# Patient Record
Sex: MALE | Race: WHITE | Employment: OTHER | URBAN - METROPOLITAN AREA
[De-identification: names, ages, dates, MRNs, and addresses within clinical notes are randomized per-mention and may not be internally consistent; named-entity substitution may affect disease eponyms.]

---

## 2018-07-20 ENCOUNTER — TELEPHONE (OUTPATIENT)
Dept: BARIATRICS/WEIGHT MGMT | Age: 53
End: 2018-07-20

## 2018-08-03 ENCOUNTER — TELEPHONE (OUTPATIENT)
Dept: BARIATRICS/WEIGHT MGMT | Age: 53
End: 2018-08-03

## 2018-08-16 ENCOUNTER — TELEPHONE (OUTPATIENT)
Dept: BARIATRICS/WEIGHT MGMT | Age: 53
End: 2018-08-16

## 2018-08-16 ENCOUNTER — OFFICE VISIT (OUTPATIENT)
Dept: BARIATRICS/WEIGHT MGMT | Age: 53
End: 2018-08-16

## 2018-08-16 VITALS
HEART RATE: 80 BPM | BODY MASS INDEX: 44.1 KG/M2 | WEIGHT: 315 LBS | DIASTOLIC BLOOD PRESSURE: 82 MMHG | HEIGHT: 71 IN | RESPIRATION RATE: 18 BRPM | SYSTOLIC BLOOD PRESSURE: 144 MMHG

## 2018-08-16 DIAGNOSIS — E11.69 DIABETES MELLITUS TYPE 2 IN OBESE (HCC): ICD-10-CM

## 2018-08-16 DIAGNOSIS — E66.9 DIABETES MELLITUS TYPE 2 IN OBESE (HCC): ICD-10-CM

## 2018-08-16 DIAGNOSIS — E66.01 MORBID OBESITY WITH BMI OF 70 AND OVER, ADULT (HCC): Primary | ICD-10-CM

## 2018-08-16 DIAGNOSIS — Z01.818 PRE-OPERATIVE CLEARANCE: ICD-10-CM

## 2018-08-16 DIAGNOSIS — G47.33 OBSTRUCTIVE SLEEP APNEA: ICD-10-CM

## 2018-08-16 DIAGNOSIS — K21.9 CHRONIC GERD: Primary | ICD-10-CM

## 2018-08-16 DIAGNOSIS — E55.9 VITAMIN D DEFICIENCY: ICD-10-CM

## 2018-08-16 DIAGNOSIS — I10 ESSENTIAL HYPERTENSION: ICD-10-CM

## 2018-08-16 DIAGNOSIS — K21.9 CHRONIC GERD: ICD-10-CM

## 2018-08-16 PROCEDURE — 1036F TOBACCO NON-USER: CPT | Performed by: SURGERY

## 2018-08-16 PROCEDURE — 2022F DILAT RTA XM EVC RTNOPTHY: CPT | Performed by: SURGERY

## 2018-08-16 PROCEDURE — G8427 DOCREV CUR MEDS BY ELIG CLIN: HCPCS | Performed by: SURGERY

## 2018-08-16 PROCEDURE — 99205 OFFICE O/P NEW HI 60 MIN: CPT | Performed by: SURGERY

## 2018-08-16 PROCEDURE — 3017F COLORECTAL CA SCREEN DOC REV: CPT | Performed by: SURGERY

## 2018-08-16 PROCEDURE — 3046F HEMOGLOBIN A1C LEVEL >9.0%: CPT | Performed by: SURGERY

## 2018-08-16 PROCEDURE — G8417 CALC BMI ABV UP PARAM F/U: HCPCS | Performed by: SURGERY

## 2018-08-16 RX ORDER — AMLODIPINE BESYLATE 10 MG/1
10 TABLET ORAL DAILY
COMMUNITY

## 2018-08-16 RX ORDER — SOTALOL HYDROCHLORIDE 80 MG/1
80 TABLET ORAL 2 TIMES DAILY
COMMUNITY

## 2018-08-16 RX ORDER — FUROSEMIDE 40 MG/1
40 TABLET ORAL 2 TIMES DAILY
COMMUNITY

## 2018-08-16 NOTE — LETTER
[address] [facility] [employer]  [address] [address]  Phone: [phone]  Fax: [phone]    Tricia Saunders MD        August 16, 2018     Pierce Ro  [address] [facility] [id number]  [facility] [address]    Patient: Ruiz Hinds  MR Number: [id number]  YOB: 1965  Date of Visit: 8/16/2018    Dear Dr. Pierce Ro:    Thank you for the request for consultation for Flora Finnegan to me for the evaluation of obesity. Below are the relevant portions of my assessment and plan of care. Ruiz Hinds is a very pleasant 46 y.o. male with Obesity,  Body mass index is 70.57 kg/m². and multiple obesity related co-morbidities. Ruiz Hinds is very motivated to lose weight and being more healthy. We discussed how his weight affects his overall health including:  Patient Active Problem List   Diagnosis    Dysphagia    HTN (hypertension)    Abdominal pain    Diabetes mellitus ([address]    Morbid obesity with BMI of 70 and over, adult ([address]    Diabetes mellitus type 2 in obese ([address]    Pre-operative clearance    Obstructive sleep apnea    Chronic GERD    Essential hypertension      The patient underwent 30 minutes of dietary counseling. I have reviewed, discussed and agree with the dietary plan. Medical weight loss and different surgical options were discussed in details with patient. Kusum Sewell is interested in surgical weight loss. Patient is interested in Laparoscopic Sleeve Gastrectomy, which I believe is an excellent option. We will proceed with pre-operative work up labs and studies. Will also petition patient's  insurance for approval for this procedure. I advised the patient that we can't guarantee final insurance approval.    Patient received dietary handouts and education. Patient advised that its their responsibility to follow up for studies, referrals and/or labs ordered today. Also discussed in details the importance of follow up, as well following the recommendations and completing the whole program to improve outcomes when it comes to healthier lifestyle as well weight loss. Patient also advised about risks and benefits being on a strict dietary regimen as well using supplements. Patient agrees and wants to proceed with weight loss planning   Recommend the patient meets with our bariatrician Dr. Dav Murphy for medical weight loss to reach the pre-surgical weight goal (BMI<65) . Patient understands that if not able to lose weight, we won't be able to move forward with surgery. Patient will resume with me once weight goal reached. Patient Instructions   Patient received dietary handouts and education. Labs ordered. Cardiac Clearance. Psych Evaluation. CPAP Compliance Report if applicable . UltraSound Gallbladder. EGD (Upper Endoscopy) pt will schedule with his colonoscopy   Support Group. PCP Letter. Quit Smoking,  Alcohol and Carbonated Drinks  Weight History 2 yrs. F/U in 4 weeks. F/U with Behaviorist.        NEED TO GET BMI <65          Patient advised that its their responsibility to follow up for studies, referrals and/or labs ordered today. If you have questions, please do not hesitate to call me. I look forward to following [employer] along with you.     Sincerely,        Mónica Mcbride MD

## 2018-08-16 NOTE — PROGRESS NOTES
patient eats until he is full. Patient describes level of activity as very sedentary - pt is in motorized scooter. Surgery  Patient's greatest concern about having surgery is: dying. Patient describes the motivation for weight loss surgery to be: wants to be more active, diabetes and takes insulin, HTN, afib, previous MI, stomach ulcer, sleep apnea - does not use a cpap, gout, liver disease, hiatal hernia, GERD, kidney disease? ?.    Patient does feel confident in his ability to make these changes. The patient's expectations of post-surgical eating habits are realistic. Patient states he does understand the consequences of not complying with post-op food guidelines. Patient states he understands the long term changes in food intake that will be necessary for all occasions after surgery for the rest of her life. he does understand the long term food changes. Patient is deemed nutritionally appropriate to proceed ONLY with major changes to diet, lifestyle. Pt should see behaviorist prior to surgery    Goals  Weight: doesn't really matter - 250  Health Improvement: improve diabetes, sleep apnea, liver disease, hiatal hernia, GERD, gout, improve mobility and energy level    Assessment  Nutritional Needs: RMR=(9.99 x 229.5) + (6.25 x 180) - (4.92 x 52 y.o.) +5  = 3167 kcal x 1.3 (very sedentary activity factor)= 4117 kcal - 1000 (for 2 lb weight loss/week)= 3117 kcal.    Plan  Surgical procedure desired: not sure    Plan/Recommendations: Surgical Procedure: not sure  General weight loss/lifestyle modification strategies discussed (elicit support from others; identify saboteurs; non-food rewards, etc).     Goals:   -Eat 4-5 times daily  -Avoid high fat and high sugar foods  -Include protein with all meals and snacks  -Avoid carbonation and caffeine  -Avoid calorie containing beverages  -Increase physical activity as tolerated    PES Statement:  Overweight/Obesity related to lack of exercise, sedentary

## 2018-08-16 NOTE — PATIENT INSTRUCTIONS
Patient received dietary handouts and education. Labs ordered. Cardiac Clearance. Psych Evaluation. CPAP Compliance Report if applicable . UltraSound Gallbladder. EGD (Upper Endoscopy) pt will schedule with his colonoscopy   Support Group. PCP Letter. Quit Smoking,  Alcohol and Carbonated Drinks  Weight History 2 yrs. F/U in 4 weeks. F/U with Behaviorist.        NEED TO GET BMI <65          Patient advised that its their responsibility to follow up for studies, referrals and/or labs ordered today.

## 2018-08-16 NOTE — PROGRESS NOTES
[facility]) Physicians   General & Laparoscopic Surgery  [employer]    Chief Complaint   Patient presents with    Bariatric, Initial Visit     NP S Medicare           HPI:    Khushboo Hernandez is a very pleasant 46 y.o. obese male ,   Body mass index is 70.57 kg/m². And multiple medical problems who is presenting for weight loss surgery evaluation and consultation by Dr. Janet Colon. Patient has been struggling for several years now with obesity. Patient feels the weight is an obstacle to achieve and perform things in daily living as well risk on health. Tries to diet, and exercise but can't keep the weight off. Patient tried Atkins Diet, Weight Watcher Anonymous, calorie restriction, low carb, and physician supervised. Patient has not participated in meal replacement/liquid diets and other regimens, but with no sustainable weight loss. Patient  is very determined to lose weight and be healthy, and is interested in surgical weight loss to achieve this goal.    Otherwise patient denies any nausea, vomiting, fevers, chills, shortness of breath, chest pain, constipation or urinary symptoms.         Pain Assessment   Denies any abdominal pain     Past Medical History:   Diagnosis Date    Anxiety     Arthritis     Asthma     Atrial fibrillation (HCC)     CAD (coronary artery disease)     CHF (congestive heart failure) (HCC)     Chronic kidney disease     Clostridium difficile infection 9/27/12    positive DNA probe    Diabetes mellitus ([address]     GERD (gastroesophageal reflux disease)     Gout     Hiatal hernia     Hypertension     Liver cirrhosis (HCC)     Liver disease     MI (myocardial infarction) ([address]     Obesity, morbid ([address]     Sleep apnea     Thyroid disease      Past Surgical History:   Procedure Laterality Date    APPENDECTOMY      KNEE SURGERY      UPPER GASTROINTESTINAL ENDOSCOPY  10/26/12    COLONOSCOPY     Family History   Problem Relation Age of normal. Cognition and memory are normal.         Can Sutton was seen today for bariatric, initial visit. Diagnoses and all orders for this visit:    Morbid obesity with BMI of 70 and over, adult ([facility]  -     CBC Auto Differential; Future  -     Comprehensive Metabolic Panel; Future  -     Lipid Panel; Future  -     TSH with Reflex; Future  -     Iron and TIBC; Future  -     Vitamin B12 & Folate; Future  -     Vitamin D 25 Hydroxy; Future  -     Hemoglobin A1C; Future  -     US Gallbladder Ruq; Future  -     Ambulatory referral to Cardiology  -     Ambulatory referral to Bariatrics    Diabetes mellitus type 2 in obese ([facility]  -     CBC Auto Differential; Future  -     Comprehensive Metabolic Panel; Future  -     Lipid Panel; Future  -     TSH with Reflex; Future  -     Iron and TIBC; Future  -     Vitamin B12 & Folate; Future  -     Vitamin D 25 Hydroxy; Future  -     Hemoglobin A1C; Future  -     US Gallbladder Ruq; Future  -     Ambulatory referral to Cardiology  -     Ambulatory referral to Bariatrics    Pre-operative clearance  -     CBC Auto Differential; Future  -     Comprehensive Metabolic Panel; Future  -     Lipid Panel; Future  -     TSH with Reflex; Future  -     Iron and TIBC; Future  -     Vitamin B12 & Folate; Future  -     Vitamin D 25 Hydroxy; Future  -     Hemoglobin A1C; Future  -     US Gallbladder Ruq; Future  -     Ambulatory referral to Cardiology  -     Ambulatory referral to Bariatrics    Obstructive sleep apnea  -     CBC Auto Differential; Future  -     Comprehensive Metabolic Panel; Future  -     Lipid Panel; Future  -     TSH with Reflex; Future  -     Iron and TIBC; Future  -     Vitamin B12 & Folate; Future  -     Vitamin D 25 Hydroxy; Future  -     Hemoglobin A1C; Future  -     US Gallbladder Ruq; Future  -     Ambulatory referral to Cardiology  -     Ambulatory referral to Bariatrics    Chronic GERD  -     CBC Auto Differential; Future  -     Comprehensive Metabolic Panel;  Future  - Lipid Panel; Future  -     TSH with Reflex; Future  -     Iron and TIBC; Future  -     Vitamin B12 & Folate; Future  -     Vitamin D 25 Hydroxy; Future  -     Hemoglobin A1C; Future  -     US Gallbladder Ruq; Future  -     Ambulatory referral to Cardiology  -     Ambulatory referral to Bariatrics    Essential hypertension  -     CBC Auto Differential; Future  -     Comprehensive Metabolic Panel; Future  -     Lipid Panel; Future  -     TSH with Reflex; Future  -     Iron and TIBC; Future  -     Vitamin B12 & Folate; Future  -     Vitamin D 25 Hydroxy; Future  -     Hemoglobin A1C; Future  -     US Gallbladder Ruq; Future  -     Ambulatory referral to Cardiology  -     Ambulatory referral to Bariatrics    Vitamin D deficiency  -     CBC Auto Differential; Future  -     Comprehensive Metabolic Panel; Future  -     Lipid Panel; Future  -     TSH with Reflex; Future  -     Iron and TIBC; Future  -     Vitamin B12 & Folate; Future  -     Vitamin D 25 Hydroxy; Future  -     Hemoglobin A1C; Future  -     US Gallbladder Ruq; Future  -     Ambulatory referral to Cardiology  -     Ambulatory referral to [address] [place] [address] is a very pleasant 46 y.o. male with Obesity,  Body mass index is 70.57 kg/m². and multiple obesity related co-morbidities. Starla Stafford is very motivated to lose weight and being more healthy. We discussed how his weight affects his overall health including:  Patient Active Problem List   Diagnosis    Dysphagia    HTN (hypertension)    Abdominal pain    Diabetes mellitus ([address]    Morbid obesity with BMI of 70 and over, adult ([address]    Diabetes mellitus type 2 in obese ([address]    Pre-operative clearance    Obstructive sleep apnea    Chronic GERD    Essential hypertension      The patient underwent 30 minutes of dietary counseling. I have reviewed, discussed and agree with the dietary plan.   Medical weight loss and different surgical options were discussed in details with patient. Raymond Perrin is interested in surgical weight loss. Patient is interested in Laparoscopic Sleeve Gastrectomy, which I believe is an excellent option. We will proceed with pre-operative work up labs and studies. Will also petition patient's  insurance for approval for this procedure. I advised the patient that we can't guarantee final insurance approval.    Patient received dietary handouts and education. Patient advised that its their responsibility to follow up for studies, referrals and/or labs ordered today. Also discussed in details the importance of follow up, as well following the recommendations and completing the whole program to improve outcomes when it comes to healthier lifestyle as well weight loss. Patient also advised about risks and benefits being on a strict dietary regimen as well using supplements. Patient agrees and wants to proceed with weight loss planning   Recommend the patient meets with our bariatrician Dr. Ilya Montes for medical weight loss to reach the pre-surgical weight goal (BMI<65) . Patient understands that if not able to lose weight, we won't be able to move forward with surgery. Patient will resume with me once weight goal reached. Patient Instructions   Patient received dietary handouts and education. Labs ordered. Cardiac Clearance. Psych Evaluation. CPAP Compliance Report if applicable . UltraSound Gallbladder. EGD (Upper Endoscopy) pt will schedule with his colonoscopy   Support Group. PCP Letter. Quit Smoking,  Alcohol and Carbonated Drinks  Weight History 2 yrs. F/U in 4 weeks. F/U with Behaviorist.        NEED TO GET BMI <65          Patient advised that its their responsibility to follow up for studies, referrals and/or labs ordered today.

## 2018-08-17 DIAGNOSIS — Z01.818 PRE-OPERATIVE CLEARANCE: Primary | ICD-10-CM

## 2018-09-14 ENCOUNTER — OFFICE VISIT (OUTPATIENT)
Dept: BARIATRICS/WEIGHT MGMT | Age: 53
End: 2018-09-14

## 2018-09-14 VITALS
SYSTOLIC BLOOD PRESSURE: 134 MMHG | DIASTOLIC BLOOD PRESSURE: 82 MMHG | HEART RATE: 68 BPM | WEIGHT: 315 LBS | BODY MASS INDEX: 44.1 KG/M2 | HEIGHT: 71 IN

## 2018-09-14 DIAGNOSIS — E11.69 DIABETES MELLITUS TYPE 2 IN OBESE (HCC): ICD-10-CM

## 2018-09-14 DIAGNOSIS — Z71.3 DIETARY COUNSELING AND SURVEILLANCE: ICD-10-CM

## 2018-09-14 DIAGNOSIS — E66.9 DIABETES MELLITUS TYPE 2 IN OBESE (HCC): ICD-10-CM

## 2018-09-14 DIAGNOSIS — Z71.89 INDIVIDUAL COUNSELING ENCOUNTER: Primary | ICD-10-CM

## 2018-09-14 DIAGNOSIS — E66.01 MORBID OBESITY WITH BMI OF 70 AND OVER, ADULT (HCC): Primary | ICD-10-CM

## 2018-09-14 PROCEDURE — 1036F TOBACCO NON-USER: CPT | Performed by: SOCIAL WORKER

## 2018-09-14 PROCEDURE — 1036F TOBACCO NON-USER: CPT | Performed by: FAMILY MEDICINE

## 2018-09-14 PROCEDURE — 99203 OFFICE O/P NEW LOW 30 MIN: CPT | Performed by: FAMILY MEDICINE

## 2018-09-14 PROCEDURE — 3046F HEMOGLOBIN A1C LEVEL >9.0%: CPT | Performed by: FAMILY MEDICINE

## 2018-09-14 PROCEDURE — 96151 PR HEAL & BEHAV ASSESS,EA 15 MIN,RE-ASSESS: CPT | Performed by: SOCIAL WORKER

## 2018-09-14 PROCEDURE — G8427 DOCREV CUR MEDS BY ELIG CLIN: HCPCS | Performed by: FAMILY MEDICINE

## 2018-09-14 PROCEDURE — G8417 CALC BMI ABV UP PARAM F/U: HCPCS | Performed by: FAMILY MEDICINE

## 2018-09-14 PROCEDURE — 3017F COLORECTAL CA SCREEN DOC REV: CPT | Performed by: FAMILY MEDICINE

## 2018-09-14 PROCEDURE — 2022F DILAT RTA XM EVC RTNOPTHY: CPT | Performed by: FAMILY MEDICINE

## 2018-09-14 ASSESSMENT — ENCOUNTER SYMPTOMS
EYES NEGATIVE: 1
GASTROINTESTINAL NEGATIVE: 1
RESPIRATORY NEGATIVE: 1

## 2018-09-14 NOTE — PATIENT INSTRUCTIONS
may talk with you about surgery. Weight-loss surgery has risks, and you will need to work with your doctor to compare the risk of having obesity with the risks of surgery. With any option you choose, you will still need to eat a healthy diet and get regular exercise. Follow-up care is a key part of your treatment and safety. Be sure to make and go to all appointments, and call your doctor if you are having problems. It's also a good idea to know your test results and keep a list of the medicines you take. Where can you learn more? Go to [email or web address] and sign in to your [employer] account. Enter N111 in the [employer] box to learn more about \"Learning About Obesity. \"     If you do not have an account, please click on the \"Sign Up Now\" link. Current as of: October 9, 2017  Content Version: 11.7  © 0757-0188 [employer]. Care instructions adapted under license by [facility] ([facility]). If you have questions about a medical condition or this instruction, always ask your healthcare professional. [address] [address] any warranty or liability for your use of this information. Patient received dietary handouts and education.     Plan/Recommendations:   - Start 1500 calorie low carb meal plan  - Avoid simple sugars  - Increase activity as able

## 2018-09-14 NOTE — PROGRESS NOTES
502 lb 8 oz       Triggers for weight gain? [] Stress   [] Illness   [] Medications   [] Travel    [] Injury     [] Nightshift work   [] Insomnia  [x] No specific triggers   [] Other    Food triggers:   [x] Stress   [x] Boredom   [x] Fast food   [x] Eating out   [x] Seeking reward   [x] Social     Have you ever taken medications to help you lose weight? [] Yes  [x] No    Have you ever been on a meal replacement program?  [] Yes  [x] No        Dietitian's assessment reviewed and addressed with patient. Reviewed:  [x] Nutrition and the importance of regular protein intake       [x] Hidden CHO/carbohydrate sources  [x] Alcohol use  [x] Tobacco use  [x] Drug use- Denies   [x] Importance of exercise and reducing sedentary time        Allergies   Allergen Reactions    Latex     Hydralazine Hcl Swelling    Phenergan [Promethazine Hcl]      Violent          Current Outpatient Prescriptions:     sotalol (BETAPACE) 80 MG tablet, Take 80 mg by mouth 2 times daily, Disp: , Rfl:     rivaroxaban (XARELTO) 20 MG TABS tablet, Take 20 mg by mouth, Disp: , Rfl:     amLODIPine (NORVASC) 10 MG tablet, Take 10 mg by mouth daily, Disp: , Rfl:     furosemide (LASIX) 40 MG tablet, Take 40 mg by mouth 2 times daily, Disp: , Rfl:     omeprazole (PRILOSEC) 20 MG capsule, Take 1 capsule by mouth 2 times daily. , Disp: 60 capsule, Rfl: 0    atorvastatin (LIPITOR) 80 MG tablet, Take 80 mg by mouth daily. , Disp: , Rfl:     baclofen (LIORESAL) 10 MG tablet, Take 10 mg by mouth 3 times daily. , Disp: , Rfl:     oxyCODONE 5 MG capsule, Take 5 mg by mouth every 6 hours as needed for Pain., Disp: , Rfl:     lisinopril (PRINIVIL;ZESTRIL) 20 MG tablet, Take 20 mg by mouth daily. , Disp: , Rfl:     carvedilol (COREG) 25 MG tablet, Take 25 mg by mouth 2 times daily (with meals). , Disp: , Rfl:     allopurinol (ZYLOPRIM) 100 MG tablet, Take 100 mg by mouth daily. , Disp: , Rfl:     Insulin Detemir (LEVEMIR FLEXPEN SC), Inject 60 Units Mother     Heart Disease Mother     Heart Disease Father        Review of Systems   HENT: Negative. Eyes: Negative. Respiratory: Negative. Cardiovascular: Negative. Gastrointestinal: Negative. Endocrine: Negative. Musculoskeletal: Negative. Neurological: Negative. Psychiatric/Behavioral: Negative. Physical Exam   Constitutional: He is oriented to person, place, and time. He appears well-developed and well-nourished. HENT:   Head: Normocephalic. Eyes: Conjunctivae and EOM are normal.   Neck: Normal range of motion. Neck supple. No thyromegaly present. Cardiovascular: Normal rate, regular rhythm and normal heart sounds. Exam reveals no gallop and no friction rub. No murmur heard. Pulmonary/Chest: Effort normal and breath sounds normal. No respiratory distress. He has no wheezes. He has no rales. Abdominal: Soft. He exhibits no mass. There is no tenderness. There is no rebound and no guarding. obese   Musculoskeletal: He exhibits no edema. Lymphadenopathy:     He has no cervical adenopathy. Neurological: He is alert and oriented to person, place, and time. Skin: Skin is warm and dry. Psychiatric: He has a normal mood and affect. His behavior is normal. Judgment and thought content normal.       Admission on 03/01/2014, Discharged on 03/04/2014   No results displayed because visit has over 200 results. Assessment and Plan:      ICD-10-CM ICD-9-CM    1. Morbid obesity with BMI of 70 and over, adult ([facility] E66.01 278.01 Heavily counseled on the importance of therapeutic lifestyle changes through diet and exercise. The patient understands that the goal of treatment is to reach and stay at a healthy weight. The initial treatment goal is to lose at least 5-10% of his body weight in 12 weeks. This will require changes in eating habits, increased physical activity, and behavior changes. Counseled on low carb diet.  Patient handouts and education material provided

## 2018-09-15 PROBLEM — Z01.818 PRE-OPERATIVE CLEARANCE: Status: RESOLVED | Noted: 2018-08-16 | Resolved: 2018-09-15

## 2018-10-10 LAB
CHOLESTEROL, TOTAL: 181 MG/DL
CHOLESTEROL: 128 MG/DL
ESTIMATED AVERAGE GLUCOSE: 192 MG/DL
FERRITIN: 252 NG/ML (ref 30–400)
HBA1C MFR BLD: 8.3 % (ref 4.2–5.6)
HDLC SERPL-MCNC: 53 MG/DL
IRON SATURATION: 31 % (ref 20–50)
IRON: 73 MCG/DL (ref 50–170)
LDL CHOLESTEROL CALCULATED: 107 MG/DL
TOTAL IRON BINDING CAPACITY: 238 MCG/DL (ref 250–400)
TRIGL SERPL-MCNC: 106 MG/DL
TSH ULTRASENSITIVE: 3.04 MCIU/ML (ref 0.27–4.2)
VITAMIN B-12: 527 PG/ML (ref 211–946)
VITAMIN D 25-HYDROXY: 12.4 NG/ML (ref 30–120)

## 2019-04-13 ENCOUNTER — PATIENT MESSAGE (OUTPATIENT)
Dept: BARIATRICS/WEIGHT MGMT | Age: 54
End: 2019-04-13